# Patient Record
Sex: MALE | Race: WHITE | NOT HISPANIC OR LATINO | Employment: FULL TIME | URBAN - METROPOLITAN AREA
[De-identification: names, ages, dates, MRNs, and addresses within clinical notes are randomized per-mention and may not be internally consistent; named-entity substitution may affect disease eponyms.]

---

## 2018-12-28 ENCOUNTER — OFFICE VISIT (OUTPATIENT)
Dept: FAMILY MEDICINE | Facility: CLINIC | Age: 28
End: 2018-12-28
Payer: COMMERCIAL

## 2018-12-28 VITALS
HEIGHT: 69 IN | WEIGHT: 150 LBS | TEMPERATURE: 98.2 F | RESPIRATION RATE: 14 BRPM | HEART RATE: 96 BPM | OXYGEN SATURATION: 99 % | SYSTOLIC BLOOD PRESSURE: 116 MMHG | DIASTOLIC BLOOD PRESSURE: 60 MMHG | BODY MASS INDEX: 22.22 KG/M2

## 2018-12-28 DIAGNOSIS — Z91.010 PEANUT ALLERGY: ICD-10-CM

## 2018-12-28 DIAGNOSIS — L30.8 OTHER ECZEMA: ICD-10-CM

## 2018-12-28 DIAGNOSIS — L70.0 ACNE VULGARIS: Primary | ICD-10-CM

## 2018-12-28 PROCEDURE — 99203 OFFICE O/P NEW LOW 30 MIN: CPT | Performed by: FAMILY MEDICINE

## 2018-12-28 RX ORDER — EPINEPHRINE 0.3 MG/.3ML
0.3 INJECTION SUBCUTANEOUS
COMMUNITY
Start: 2016-07-25 | End: 2018-12-28

## 2018-12-28 RX ORDER — DIPHENHYDRAMINE HCL 25 MG
25-50 CAPSULE ORAL
COMMUNITY
Start: 2016-08-07

## 2018-12-28 RX ORDER — EPINEPHRINE 0.3 MG/.3ML
0.3 INJECTION SUBCUTANEOUS PRN
Qty: 0.3 ML | Refills: 3 | Status: SHIPPED | OUTPATIENT
Start: 2018-12-28

## 2018-12-28 RX ORDER — TRIAMCINOLONE ACETONIDE 1 MG/G
CREAM TOPICAL
Qty: 30 G | Refills: 11 | Status: SHIPPED | OUTPATIENT
Start: 2018-12-28

## 2018-12-28 RX ORDER — KETOCONAZOLE 20 MG/G
CREAM TOPICAL
COMMUNITY
Start: 2016-07-25

## 2018-12-28 RX ORDER — CLINDAMYCIN PHOSPHATE 11.9 MG/ML
SOLUTION TOPICAL 2 TIMES DAILY
Qty: 60 ML | Refills: 11 | Status: SHIPPED | OUTPATIENT
Start: 2018-12-28 | End: 2019-12-28

## 2018-12-28 RX ORDER — TRIAMCINOLONE ACETONIDE 1 MG/G
OINTMENT TOPICAL
COMMUNITY
Start: 2016-07-25 | End: 2019-01-16

## 2018-12-28 ASSESSMENT — MIFFLIN-ST. JEOR: SCORE: 1644.19

## 2018-12-28 NOTE — PATIENT INSTRUCTIONS
Consider shea moisturizing cream for hands.  Use triamcinolone cream three times daily for up to 7 days, then sparingly - lowest effective dose, least frequent dosing.    OK to use epipen for reaction to peanuts if you come into contact with them.    I recommend use of clotrimazole 1% OTC cream to athlete's foot. If not resolved in 1 week or so then trial of triamcinolone.  If still not resolved within 1 week.    Use acne medicines as prescribed.

## 2018-12-28 NOTE — PROGRESS NOTES
"  SUBJECTIVE:   Nnamdi Reed is a 28 year old male who presents to clinic today for the following health issues:    Eczema and acne      Duration: ongoing issue    Description: Feels acne is getting out of control    Intensity:  moderate    Accompanying signs and symptoms: None    History (similar episodes/previous evaluation): Eczema     Precipitating or alleviating factors:  New exposures:  None  Recent travel: no      Therapies tried and outcome: triamcinolone ointment 1%    Athlete's Foot      Duration: ongoing issue    Description:  Bilateral feet    Intensity:  moderate    Accompanying signs and symptoms: itchiness, burning    History (similar episodes/previous evaluation): YES     Precipitating or alleviating factors:  New exposures:  None  Recent travel: no      Therapies tried and outcome: Ketoconazole ointment 2%    The last time he got to went to the doctor he was given triamcinolone ointment 0.1% to use at nighttime. It helped a little, but didn't want to use during the day because it was so greasy.    He was using ketoconazole for athlete's foot. 2% cream was not helping.    He has a prescription for an Epipen. This is for allergy to peanuts. Last used Epipen around August, 2016.    He was wondering about his forehead breaking out with acne more than usual. He has used tretinoin cream topically. Used benzoyl peroxide - only his face.    Soc Hx  Doesn't smoke  Rare alcohol use    PMHx  Peanut allergy  Asthma as a kid    Surg Hx  Did have wisdom teeth removed    Family Hx  Mom has diabetes  Paternal grandfather has prostate cancer  Brother has depression    ROS  No fever  Getting over a case of stomach flu - some chills yesterday  Some shoulder discomfort    EXAM:  /60   Pulse 96   Temp 98.2  F (36.8  C)   Resp 14   Ht 1.75 m (5' 8.9\")   Wt 68 kg (150 lb)   SpO2 99%   BMI 22.22 kg/m    Constitutional: Healthy, alert, no distress   Cardiovascular: RRR. No murmurs   Respiratory: " Clear to auscultation   Skin: Some scaling of hands and lateral aspect of right foot. Some erythema of forehead with comedones    ASSESSMENT    ICD-10-CM    1. Acne vulgaris L70.0    2. Peanut allergy Z91.010 EPINEPHrine (EPIPEN 2-BRIDGET) 0.3 MG/0.3ML injection 2-pack   3. Other eczema L30.8       Plan:  Patient Instructions   Consider shea moisturizing cream for hands.  Use triamcinolone cream three times daily for up to 7 days, then sparingly - lowest effective dose, least frequent dosing.    OK to use epipen for reaction to peanuts if you come into contact with them.    I recommend use of clotrimazole 1% OTC cream to athlete's foot. If not resolved in 1 week or so then trial of triamcinolone.  If still not resolved within 1 week.    Use acne medicines as prescribed.         Return in about 2 weeks (around 1/11/2019) for Routine Visit.    Jared Kunz MD  Family Medicine Physician

## 2019-01-16 ENCOUNTER — OFFICE VISIT (OUTPATIENT)
Dept: FAMILY MEDICINE | Facility: CLINIC | Age: 29
End: 2019-01-16
Payer: COMMERCIAL

## 2019-01-16 VITALS
BODY MASS INDEX: 22.96 KG/M2 | WEIGHT: 155 LBS | TEMPERATURE: 97.8 F | SYSTOLIC BLOOD PRESSURE: 118 MMHG | HEART RATE: 60 BPM | RESPIRATION RATE: 14 BRPM | DIASTOLIC BLOOD PRESSURE: 76 MMHG | HEIGHT: 69 IN | OXYGEN SATURATION: 98 %

## 2019-01-16 DIAGNOSIS — M67.911 ROTATOR CUFF DYSFUNCTION, RIGHT: Primary | ICD-10-CM

## 2019-01-16 PROCEDURE — 99213 OFFICE O/P EST LOW 20 MIN: CPT | Performed by: FAMILY MEDICINE

## 2019-01-16 ASSESSMENT — MIFFLIN-ST. JEOR: SCORE: 1666.87

## 2019-01-16 NOTE — PATIENT INSTRUCTIONS
Consider applying creams with finger cots or cosmetic pads.  Soak feet 2-3 times a week for 10 minutes or so with salts, bath or foot bath.  Continue antifungal cream (clotrimazole) and apply it to the areas of nail that are affected. I expect improvement over the coming years.    Could use claritin 10mg daily for eye symptoms suggestive of allergy.  Naphazoline is an OTC eye drop that could also be used for these allergy symptoms in the eyes.    Consider probiotics.  Attention to amount of fiber in diet. 20-25 grams per day.    I recommend PT for balancing muscles of shoulder movement. I suspect a degree of pressure under acromial process.

## 2019-01-16 NOTE — PROGRESS NOTES
"  SUBJECTIVE:   Nnamdi Reed is a 28 year old male who presents to clinic today for the following health issues:      Consult- Eyes, digestion and skin issues    The pharmacy didn't have a prescription version of benzoyl peroxide but he is using 2.5%.    He is having a good response, but having difficulty keeping up with recommendations.    Athlete's foot is clearing well with clotrimazole.    He had eye dryness noted ongoing. Has an eye exam next week. His experience is that OTC eye drops for lubrication.    He is exercising more and enjoying doing so.  He is feeling a bit of pain/discomfort in his right shoulder. He noticed this lifting heavy objects overhead.    Since then he has not been experiencing pain or discomfort.    Notes some indigestion symptoms. Has noted some foods are less easily tolerated.  Lactose has helped with milk digestion.  He notes some gassiness, not diarrhea and some loose bowel movements.    EXAM:  /76   Pulse 60   Temp 97.8  F (36.6  C)   Resp 14   Ht 1.75 m (5' 8.9\")   Wt 70.3 kg (155 lb)   SpO2 98%   BMI 22.96 kg/m    Constitutional: Healthy, alert, no distress   EENT: PERRL, TM's clear bilaterally, oropharynx without erythema, no anterior cervical lymphadenopathy   Cardiovascular: RRR. No murmurs   Respiratory: Clear to auscultation   Musculoskeletal: Good shoulder range of motion, slight discomfort inferior to right acromion, no tenderness of biceps tendon.   Skin: some receding of cuticles or 2nd and 3rd fingers on right hand. Slight thickening of right great toenail medial and lateral border and right 5th toenail. Minimal erythema of skin between toes and on hands.    ASSESSMENT    ICD-10-CM    1. Rotator cuff dysfunction, right M67.911 [facility]      Plan:  Patient Instructions   Consider applying creams with finger cots or cosmetic pads.  Soak feet 2-3 times a week for 10 minutes or so with salts, bath or foot " bath.  Continue antifungal cream (clotrimazole) and apply it to the areas of nail that are affected. I expect improvement over the coming years.    Could use claritin 10mg daily for eye symptoms suggestive of allergy.  Naphazoline is an OTC eye drop that could also be used for these allergy symptoms in the eyes.    Consider probiotics.  Attention to amount of fiber in diet. 20-25 grams per day.    I recommend PT for balancing muscles of shoulder movement. I suspect a degree of pressure under acromial process.       Return in about 6 months (around 7/16/2019) for Physical Exam.    Jared Kunz MD  Family Medicine Physician

## 2019-01-29 ENCOUNTER — THERAPY VISIT (OUTPATIENT)
Dept: PHYSICAL THERAPY | Facility: CLINIC | Age: 29
End: 2019-01-29
Payer: COMMERCIAL

## 2019-01-29 DIAGNOSIS — M67.911 ROTATOR CUFF DYSFUNCTION, RIGHT: ICD-10-CM

## 2019-01-29 PROCEDURE — 97530 THERAPEUTIC ACTIVITIES: CPT | Mod: GP | Performed by: PHYSICAL THERAPIST

## 2019-01-29 PROCEDURE — 97110 THERAPEUTIC EXERCISES: CPT | Mod: GP | Performed by: PHYSICAL THERAPIST

## 2019-01-29 PROCEDURE — 97161 PT EVAL LOW COMPLEX 20 MIN: CPT | Mod: GP | Performed by: PHYSICAL THERAPIST

## 2019-01-29 NOTE — PROGRESS NOTES
[facility] Initial Evaluation  Subjective:  [facility]                  Physical Therapy Initial Evaluation  January 29, 2019     Precautions/Restrictions/MD instructions: PT eval and treat.     Subjective:   Date of Onset: September 2018,  MD order on 1/16/19  C/C: right sided lateral shoulder.   Quality of pain is aching and sharp. Pains are described as intermittent in nature. Pain is worse: with use. Pain is rated 3/10.   History of symptoms: Pains began gradually as the result of increasing activity at crossfit. Since onset, symptoms are improving. Previous episodes: none  Worsened by: power cleans(dropping the weight), wall balls, push press  Alleviated by: Rest.    General health as reported by patient: good  Pertinent medical/surgical history: Asthma. He denies night pain, significant current illness or recent hospital admission. He denies any regional implanted devices. He denies history of surgery in the area.  Imaging: none. Current occupational status: teacher. Patient's goals are: decrease pain, improve tolerance to crossfit and overhead lifting. Return to MD:  PRN.     Objective:  SHOULDER:     PROM L PROM R MMT L MMT R   Flex 162 175 5/5 4+/5   Abd 180 180 5/5 5/5   Extension 40 52 5/5 5/5   IR   5/5 5/5   ER 75 62 - improves to 80 following repeated extension 5/5 4/5, improves to 5/5 after repeated shoulder extension   Ext/IR T5 T3 5/5 5/5     Full Chain Flexion: Significant thoracic extension    Palpation: no tenderness to palpation    Assessment/Plan:    The patient is a 28 year old male with chief complaint of right shoulder pain.    The patient has the following significant findings with corresponding treatment plan.  Diagnosis 1:  Right shoulder pain    Pain -  hot/cold therapy, manual therapy, self management, education, directional preference exercise and home program  Decreased ROM/flexibility - manual therapy and therapeutic exercise  Decreased strength - therapeutic exercise  and therapeutic activities  Impaired muscle performance - neuro re-education  Decreased function - therapeutic activities  Impaired posture - neuro re-education    Therapy Evaluation Codes:   1) History comprised of:   Personal factors that impact the plan of care:      None.    Comorbidity factors that impact the plan of care are:      Asthma.     Medications impacting care: None.  2) Examination of Body Systems comprised of:   Body structures and functions that impact the plan of care:      Cervical spine, Shoulder and Thoracic Spine.   Activity limitations that impact the plan of care are:      Lifting and Throwing.   Clinical presentation characteristics are:    Stable/Uncomplicated.  3) Presentation comprised of:   Presentation scored as Low complexity with uncomplicated characteristics..  4) Decision-Making    Low complexity using standardized patient assessment instrument and/or measureable assessment of functional outcome.  Cumulative Therapy Evaluation is: Low complexity.    Previous and current functional limitations:  (See Goal Flow Sheet for this information)    Short term and Long term goals: (See Goal Flow Sheet for this information)     Communication ability:  Patient appears to be able to clearly communicate and understand verbal and written communication and follow directions correctly.  Treatment Explanation - The following has been discussed with the patient: RX ordered/plan of care, anticipated outcomes, and possible risks and side effects.  This patient would benefit from PT intervention to resume normal activities.   Rehab potential is good.    Frequency:  1 X week, once daily  Duration:  for 6 weeks  Discharge Plan: Achieve all LTGs, be independent in home treatment program, and reach maximal therapeutic benefit.    Please refer to the daily flowsheet for treatment today, total treatment time and time spent performing 1:1 timed codes.       Objective:  System    Physical Exam    General      ROS

## 2019-02-07 ENCOUNTER — THERAPY VISIT (OUTPATIENT)
Dept: PHYSICAL THERAPY | Facility: CLINIC | Age: 29
End: 2019-02-07
Payer: COMMERCIAL

## 2019-02-07 DIAGNOSIS — M67.911 ROTATOR CUFF DYSFUNCTION, RIGHT: ICD-10-CM

## 2019-02-07 PROCEDURE — 97112 NEUROMUSCULAR REEDUCATION: CPT | Mod: GP | Performed by: PHYSICAL THERAPIST

## 2019-02-07 PROCEDURE — 97530 THERAPEUTIC ACTIVITIES: CPT | Mod: GP | Performed by: PHYSICAL THERAPIST

## 2019-02-07 PROCEDURE — 97110 THERAPEUTIC EXERCISES: CPT | Mod: GP | Performed by: PHYSICAL THERAPIST

## 2019-06-13 PROBLEM — M67.911 ROTATOR CUFF DYSFUNCTION, RIGHT: Status: RESOLVED | Noted: 2019-01-29 | Resolved: 2019-06-13

## 2019-06-13 NOTE — PROGRESS NOTES
Discharge Note    Progress reporting period is from initial evaluation date (please see noted date below) to Feb 7, 2019.    Nnamdi failed to follow up and current status is unknown.  Please see information below for last relevant information on current status.  Patient seen for 2 visits.    SUBJECTIVE  Subjective changes noted by patient:  Nnamdi reports that he has not had pain during UE lifts and no pain in the past week and a half. Pt reports he feels 70% better overall. He was able to lift 20# more in push press than before without pain.   .  Current pain level is 0/10.     Previous pain level was   .   Changes in function:  Yes (See Goal flowsheet attached for changes in current functional level)  Adverse reaction to treatment or activity: None    OBJECTIVE  Changes noted in objective findings: R shoulder flexion to 165, , ER 70, EXT 50.     ASSESSMENT/PLAN  Diagnosis: right rotator cuff dysfunction   Updated problem list and treatment plan:   Decreased ROM/flexibility - HEP  Decreased strength - HEP  STG/LTGs have been met or progress has been made towards goals:  Yes, please see goal flowsheet for most current information  Assessment of Progress: current status is unknown.    Last current status: Pt is progressing well   Self Management Plans:  HEP  I have re-evaluated this patient and find that the nature, scope, duration and intensity of the therapy is appropriate for the medical condition of the patient.  Nnamdi continues to require the following intervention to meet STG and LTG's:  HEP.    Recommendations:  Discharge with current home program.  Patient to follow up with MD as needed.    Please refer to the daily flowsheet for treatment today, total treatment time and time spent performing 1:1 timed codes.

## 2020-01-10 ENCOUNTER — THERAPY VISIT (OUTPATIENT)
Dept: PHYSICAL THERAPY | Facility: CLINIC | Age: 30
End: 2020-01-10
Payer: COMMERCIAL

## 2020-01-10 DIAGNOSIS — M54.50 ACUTE MIDLINE LOW BACK PAIN WITHOUT SCIATICA: ICD-10-CM

## 2020-01-10 PROCEDURE — 97110 THERAPEUTIC EXERCISES: CPT | Mod: GP | Performed by: PHYSICAL THERAPIST

## 2020-01-10 PROCEDURE — 97112 NEUROMUSCULAR REEDUCATION: CPT | Mod: GP | Performed by: PHYSICAL THERAPIST

## 2020-01-10 PROCEDURE — 97161 PT EVAL LOW COMPLEX 20 MIN: CPT | Mod: GP | Performed by: PHYSICAL THERAPIST

## 2020-01-10 NOTE — PROGRESS NOTES
[facility] [facility] Initial Evaluation  Subjective:  The history is provided by the patient.   Nnamdi Reed being seen for LBP.   Problem began 1/6/2020. Where condition occurred: during recreation / sport.Problem occurred: Deadlift; on the last set after his first workout after the holiday break. Felt a pop in his low back  and reported as 3/10 on pain scale. General health as reported by patient is good. Pertinent medical history includes:  Asthma.         Primary job tasks include:  Prolonged standing and prolonged sitting. Other job/home tasks details: cross fit (3-4 d/wk), swimming 2 d/wk (new activity, November).  Pain is described as aching and is intermittent. Pain is worse in the A.M.. Since onset symptoms are unchanged.      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        Type of problem:  Lumbar   Condition occurred with:  Lifting. This is a new condition    Patient reports pain:  Lower lumbar spine. Radiates to:  No radiation. Associated symptoms:  Loss of motion/stiffness. Symptoms are exacerbated by bending and lifting (driving; dressing (socks and shoes)) and relieved by rest and NSAID's.                      Objective:  System         Lumbar/SI Evaluation  ROM:    AROM Lumbar:   Flexion:          Mod -  Ext:                    Min -   Side Bend:        Left:  Mod -    Right:  Min -  Rotation:           Left:     Right:   Side Glide:        Left:     Right:         Strength: + active SLR; 4+/5 B glute med            Lumbar Palpation:    Tenderness present at Left:    Hip Flexors (dec hip ext)  Tenderness not present at Left:    Erector Spinae or Vertebral    Tenderness not present at Right:  Erector Spinae or Vertebral  Functional Tests:  Core strength and proprioception lumbar: excessive lumbar movement with DL and Squat.        Lumbar Provocation:      Left negative with:  Stork w/ext and PROM hip    Right negative with:   Stork w/ext and PROM hip                                                 General     ROS    Assessment/Plan:    Patient is a 29 year old male with lumbar complaints.    Patient has the following significant findings with corresponding treatment plan.                Diagnosis 1:  Acute LBP  Pain -  manual therapy, self management, education, directional preference exercise and home program  Decreased ROM/flexibility - manual therapy, therapeutic exercise and home program  Decreased joint mobility - manual therapy, therapeutic exercise and home program  Decreased strength - therapeutic exercise, therapeutic activities and home program  Impaired posture - neuro re-education, therapeutic activities and home program    Therapy Evaluation Codes:   Cumulative Therapy Evaluation is: Low complexity.    Previous and current functional limitations:  (See Goal Flow Sheet for this information)    Short term and Long term goals: (See Goal Flow Sheet for this information)     Communication ability:  Patient appears to be able to clearly communicate and understand verbal and written communication and follow directions correctly.  Treatment Explanation - The following has been discussed with the patient:   RX ordered/plan of care  Anticipated outcomes  Possible risks and side effects  This patient would benefit from PT intervention to resume normal activities.   Rehab potential is good.    Frequency:  1 X week, once daily  Duration:  for 3 weeks tapering to 2 X a month over 4 weeks  Discharge Plan:  Achieve all LTG.  Independent in home treatment program.  Return to previous functional level by discharge.  Reach maximal therapeutic benefit.    Please refer to the daily flowsheet for treatment today, total treatment time and time spent performing 1:1 timed codes.

## 2020-01-14 PROBLEM — M54.50 MIDLINE LOW BACK PAIN WITHOUT SCIATICA: Status: ACTIVE | Noted: 2020-01-14

## 2020-03-11 ENCOUNTER — HEALTH MAINTENANCE LETTER (OUTPATIENT)
Age: 30
End: 2020-03-11

## 2020-04-15 PROBLEM — M54.50 MIDLINE LOW BACK PAIN WITHOUT SCIATICA: Status: RESOLVED | Noted: 2020-01-14 | Resolved: 2020-04-15

## 2021-01-03 ENCOUNTER — HEALTH MAINTENANCE LETTER (OUTPATIENT)
Age: 31
End: 2021-01-03

## 2021-04-25 ENCOUNTER — HEALTH MAINTENANCE LETTER (OUTPATIENT)
Age: 31
End: 2021-04-25

## 2021-10-10 ENCOUNTER — HEALTH MAINTENANCE LETTER (OUTPATIENT)
Age: 31
End: 2021-10-10

## 2022-05-21 ENCOUNTER — HEALTH MAINTENANCE LETTER (OUTPATIENT)
Age: 32
End: 2022-05-21

## 2022-09-18 ENCOUNTER — HEALTH MAINTENANCE LETTER (OUTPATIENT)
Age: 32
End: 2022-09-18

## 2023-06-04 ENCOUNTER — HEALTH MAINTENANCE LETTER (OUTPATIENT)
Age: 33
End: 2023-06-04

## 2023-09-08 ENCOUNTER — TRANSFERRED RECORDS (OUTPATIENT)
Dept: HEALTH INFORMATION MANAGEMENT | Facility: CLINIC | Age: 33
End: 2023-09-08

## 2023-10-07 ENCOUNTER — MEDICAL CORRESPONDENCE (OUTPATIENT)
Dept: HEALTH INFORMATION MANAGEMENT | Facility: CLINIC | Age: 33
End: 2023-10-07
Payer: COMMERCIAL

## 2023-10-10 ENCOUNTER — TRANSCRIBE ORDERS (OUTPATIENT)
Dept: OTHER | Age: 33
End: 2023-10-10

## 2023-10-10 DIAGNOSIS — K60.2 ANAL FISSURE: Primary | ICD-10-CM

## 2023-10-19 NOTE — TELEPHONE ENCOUNTER
Diagnosis, Referred by & from: Anal Fissure   Appt date: 1/16/2024   NOTES STATUS DETAILS   OFFICE NOTE from referring provider Received [place]:  9/8/23, 8/9/23 - [facility] OV with Dr. Altamirano (8/9 note within 9/8 note)   OFFICE NOTE from other specialist N/A    DISCHARGE SUMMARY from hospital N/A    DISCHARGE REPORT from the ER N/A    OPERATIVE REPORT N/A    MEDICATION LIST Received    LABS N/A    DIAGNOSTIC PROCEDURES N/A    IMAGING (DISC & REPORT) N/A

## 2023-12-02 ENCOUNTER — OFFICE VISIT (OUTPATIENT)
Dept: URGENT CARE | Facility: URGENT CARE | Age: 33
End: 2023-12-02
Payer: COMMERCIAL

## 2023-12-02 VITALS
DIASTOLIC BLOOD PRESSURE: 86 MMHG | SYSTOLIC BLOOD PRESSURE: 142 MMHG | RESPIRATION RATE: 16 BRPM | TEMPERATURE: 98.3 F | HEART RATE: 62 BPM | OXYGEN SATURATION: 100 %

## 2023-12-02 DIAGNOSIS — H10.33 ACUTE BACTERIAL CONJUNCTIVITIS OF BOTH EYES: Primary | ICD-10-CM

## 2023-12-02 PROCEDURE — 99203 OFFICE O/P NEW LOW 30 MIN: CPT | Performed by: INTERNAL MEDICINE

## 2023-12-02 RX ORDER — POLYMYXIN B SULFATE AND TRIMETHOPRIM 1; 10000 MG/ML; [USP'U]/ML
1-2 SOLUTION OPHTHALMIC EVERY 4 HOURS
Qty: 10 ML | Refills: 0 | Status: SHIPPED | OUTPATIENT
Start: 2023-12-02

## 2023-12-02 NOTE — PROGRESS NOTES
Assessment & Plan     Acute bacterial conjunctivitis of both eyes  - polymixin b-trimethoprim (POLYTRIM) 20198-9.1 UNIT/ML-% ophthalmic solution; Place 1-2 drops into both eyes every 4 hours    Gonzales Harris MD  [facility] [place]    Janeth Coto is a 33 year old, presenting for the following health issues:  Eye Problem (Eye drainage started this morning, dull pain, itchiness) and Medication Update (Taking truvada, nitroglycerin ointment)    HPI   Chief complaint of eye irritation and drainage. Has noted just today the onset of this.  Got COVID vaccine two days ago.  Has been having some cough and raspy voice.  Does not wear contact lenses.      Review of Systems   Constitutional, HEENT, cardiovascular, pulmonary, gi and gu systems are negative, except as otherwise noted.      Objective    BP (!) 142/86   Pulse 62   Temp 98.3  F (36.8  C) (Oral)   Resp 16   SpO2 100%   There is no height or weight on file to calculate BMI.  Physical Exam   GENERAL APPEARANCE: healthy, alert, and no distress  EYES: PERRL, and conjunctiva/corneas- conjunctival injection OU and green colored discharge present bilateral

## 2023-12-14 ENCOUNTER — TELEPHONE (OUTPATIENT)
Dept: SURGERY | Facility: CLINIC | Age: 33
End: 2023-12-14
Payer: COMMERCIAL

## 2023-12-14 NOTE — TELEPHONE ENCOUNTER
LVM, sent mychart to reschedule the following appointment:    Appt on 1/16/24 with Cynthia Dunlap due to the provider not being available at the scheduled time. Reschedule next available appt with the same provider (as of now there are openings later on the same day). Left CC#

## 2024-01-16 ENCOUNTER — PRE VISIT (OUTPATIENT)
Dept: SURGERY | Facility: CLINIC | Age: 34
End: 2024-01-16

## 2024-02-05 ENCOUNTER — OFFICE VISIT (OUTPATIENT)
Dept: SURGERY | Facility: CLINIC | Age: 34
End: 2024-02-05
Payer: COMMERCIAL

## 2024-02-05 VITALS — OXYGEN SATURATION: 99 % | HEART RATE: 79 BPM | DIASTOLIC BLOOD PRESSURE: 82 MMHG | SYSTOLIC BLOOD PRESSURE: 141 MMHG

## 2024-02-05 DIAGNOSIS — K60.2 ANAL FISSURE: ICD-10-CM

## 2024-02-05 PROCEDURE — 99203 OFFICE O/P NEW LOW 30 MIN: CPT | Performed by: NURSE PRACTITIONER

## 2024-02-05 RX ORDER — EMTRICITABINE AND TENOFOVIR DISOPROXIL FUMARATE 200; 300 MG/1; MG/1
TABLET, FILM COATED ORAL
COMMUNITY
Start: 2022-11-01

## 2024-02-05 ASSESSMENT — PAIN SCALES - GENERAL: PAINLEVEL: NO PAIN (0)

## 2024-02-05 NOTE — LETTER
2024       RE: Nnamdi Reed  [address]  [address]  [place] [address]       Dear Colleague,    Thank you for referring your patient, Nnamdi Reed, to the [facility] [place] at [facility]. Please see a copy of my visit note below.    Colon and Rectal Surgery Consult Clinic Note    Date: 2024     Referring provider:  Vicki Altamirano MD  [address] [address]     RE: Nnamdi Reed  : 1990  HAYDEN: 2024    Nnamdi Reed is a very pleasant 33 year old male here for anal fissure.    HPI:  Vincent developed an anal fissure last summer after anal intercourse.  He had never had difficulty with anal intercourse in the past.  He was treated in August with topical nitroglycerin.  He initially felt like he healed but then had anal intercourse again in November and thinks this reopened the anal fissure.  He was then given topical nifedipine and has been using this for 2 months.  He no longer has any pain or bleeding. He tried using anal dilators and started with the smallest one. Had some pain with the second smallest and some bleeding after. Has not tried having anal intercourse since November.    Physical Examination: Exam was chaperoned by Roger Dinero, EMT-P   BP (!) 141/82 (BP Location: Left arm, Patient Position: Sitting, Cuff Size: Adult Regular)   Pulse 79   SpO2 99%   General: alert, oriented, in no acute distress, sitting comfortably  HEENT: mucous membranes moist    Perianal external examination:  Perianal skin: Intact with no excoriation or lichenification.  Lesions: No evidence of an external lesion, nodularity, or induration in the perianal region.  Eversion of buttocks: There was not evidence of an anal fissure. Details: small scar in the posterior midline.    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 No current fissure and he has healed well with topical therapy. No constipation or straining. Discussed that the fissure could be a result of or have caused some pelvic floor dysfunction causing recurrent fissures. I would like him to meet with Rosemary Nevarez PT for evaluation and to see if she can improve this. He is agreeable to this. Will have him follow up as needed if fissure recurs and will need anal Pap at age 46.    Medical history:  No past medical history on file.    Surgical history:  No past surgical history on file.    Problem list:  There are no problems to display for this patient.      Medications:  Current Outpatient Medications   Medication Sig Dispense Refill    emtricitabine-tenofovir (TRUVADA) 200-300 MG per tablet       EPINEPHrine (EPIPEN 2-BRIDGET) 0.3 MG/0.3ML injection 2-pack Inject 0.3 mLs (0.3 mg) into the muscle as needed for anaphylaxis 0.3 mL 3    nifedipine 0.2% in white petrolatum 0.2 % OINT ointment       polymixin b-trimethoprim (POLYTRIM) 19163-9.1 UNIT/ML-% ophthalmic solution Place 1-2 drops into both eyes every 4 hours 10 mL 0    triamcinolone (KENALOG) 0.1 % external cream Use three times a day for up to 7 days then only if needed for exacerbations. 30 g 11    benzoyl peroxide (BREVOXYL) 4 % external gel Use twice daily for acne (Patient not taking: Reported on 12/2/2023) 42.5 g 11    diphenhydrAMINE (BENADRYL) 25 MG capsule Take 25-50 mg by mouth (Patient not taking: Reported on 12/2/2023)      ketoconazole (NIZORAL) 2 % external cream  (Patient not taking: Reported on 12/2/2023)         Allergies:  Allergies   Allergen Reactions    Ipratropium Anaphylaxis    Peanut (Diagnostic) Anaphylaxis, Cramps, Difficulty breathing and Hives    Azithromycin GI Disturbance       Family history:  No family history on file.    Social history:  Social History     Tobacco  Use    Smoking status: Never    Smokeless tobacco: Never   Substance Use Topics    Alcohol use: Not on file    Marital status: single.    Nursing Notes:   Roger Dinero, EMT  2/5/2024 11:43 AM  Signed  Chief Complaint   Patient presents with    Consult       Vitals:    02/05/24 1141   BP: (!) 141/82   BP Location: Left arm   Patient Position: Sitting   Cuff Size: Adult Regular   Pulse: 79   SpO2: 99%       There is no height or weight on file to calculate BMI.    Roger Dinero EMT-P       30 minutes spent on the date of encounter performing chart review, history and exam, documentation and further activities as noted above       This note was created using speech recognition software and may contain unintended word substitutions.      Again, thank you for allowing me to participate in the care of your patient.      Sincerely,    ИВАН Cortez CNP

## 2024-02-05 NOTE — PROGRESS NOTES
Colon and Rectal Surgery Consult Clinic Note    Date: 2024     Referring provider:  Vicki Altamirano MD  [address] [address]     RE: Nnamdi Reed  : 1990  HAYDEN: 2024    Nnamdi Reed is a very pleasant 33 year old male here for anal fissure.    HPI:  Vincent developed an anal fissure last summer after anal intercourse.  He had never had difficulty with anal intercourse in the past.  He was treated in August with topical nitroglycerin.  He initially felt like he healed but then had anal intercourse again in November and thinks this reopened the anal fissure.  He was then given topical nifedipine and has been using this for 2 months.  He no longer has any pain or bleeding. He tried using anal dilators and started with the smallest one. Had some pain with the second smallest and some bleeding after. Has not tried having anal intercourse since November.    Physical Examination: Exam was chaperoned by Roger Dinero, EMT-P   BP (!) 141/82 (BP Location: Left arm, Patient Position: Sitting, Cuff Size: Adult Regular)   Pulse 79   SpO2 99%   General: alert, oriented, in no acute distress, sitting comfortably  HEENT: mucous membranes moist    Perianal external examination:  Perianal skin: Intact with no excoriation or lichenification.  Lesions: No evidence of an external lesion, nodularity, or induration in the perianal region.  Eversion of buttocks: There was not evidence of an anal fissure. Details: small scar in the posterior midline.    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 No current fissure and he has healed well with topical therapy. No constipation or straining. Discussed that the fissure could be a result of or have caused some pelvic floor dysfunction causing recurrent fissures. I would like him to meet with  Rosemary Nevarez PT for evaluation and to see if she can improve this. He is agreeable to this. Will have him follow up as needed if fissure recurs and will need anal Pap at age 46.    Medical history:  No past medical history on file.    Surgical history:  No past surgical history on file.    Problem list:  There are no problems to display for this patient.      Medications:  Current Outpatient Medications   Medication Sig Dispense Refill    emtricitabine-tenofovir (TRUVADA) 200-300 MG per tablet       EPINEPHrine (EPIPEN 2-BRIDGET) 0.3 MG/0.3ML injection 2-pack Inject 0.3 mLs (0.3 mg) into the muscle as needed for anaphylaxis 0.3 mL 3    nifedipine 0.2% in white petrolatum 0.2 % OINT ointment       polymixin b-trimethoprim (POLYTRIM) 50680-8.1 UNIT/ML-% ophthalmic solution Place 1-2 drops into both eyes every 4 hours 10 mL 0    triamcinolone (KENALOG) 0.1 % external cream Use three times a day for up to 7 days then only if needed for exacerbations. 30 g 11    benzoyl peroxide (BREVOXYL) 4 % external gel Use twice daily for acne (Patient not taking: Reported on 12/2/2023) 42.5 g 11    diphenhydrAMINE (BENADRYL) 25 MG capsule Take 25-50 mg by mouth (Patient not taking: Reported on 12/2/2023)      ketoconazole (NIZORAL) 2 % external cream  (Patient not taking: Reported on 12/2/2023)         Allergies:  Allergies   Allergen Reactions    Ipratropium Anaphylaxis    Peanut (Diagnostic) Anaphylaxis, Cramps, Difficulty breathing and Hives    Azithromycin GI Disturbance       Family history:  No family history on file.    Social history:  Social History     Tobacco Use    Smoking status: Never    Smokeless tobacco: Never   Substance Use Topics    Alcohol use: Not on file    Marital status: single.    Nursing Notes:   Roger Dinero, EMT  2/5/2024 11:43 AM  Signed  Chief Complaint   Patient presents with    Consult       Vitals:    02/05/24 1141   BP: (!) 141/82   BP Location: Left arm   Patient Position: Sitting   Cuff Size:  Adult Regular   Pulse: 79   SpO2: 99%       There is no height or weight on file to calculate BMI.    Roger Dinero EMT-P       30 minutes spent on the date of encounter performing chart review, history and exam, documentation and further activities as noted above     ИВАН Barron, NP-C  Colon and Rectal Surgery   [facility]    This note was created using speech recognition software and may contain unintended word substitutions.

## 2024-02-05 NOTE — NURSING NOTE
Chief Complaint   Patient presents with    Consult       Vitals:    02/05/24 1141   BP: (!) 141/82   BP Location: Left arm   Patient Position: Sitting   Cuff Size: Adult Regular   Pulse: 79   SpO2: 99%       There is no height or weight on file to calculate BMI.    Roger Dinero EMT-P

## 2024-02-26 ENCOUNTER — THERAPY VISIT (OUTPATIENT)
Dept: PHYSICAL THERAPY | Facility: CLINIC | Age: 34
End: 2024-02-26
Payer: COMMERCIAL

## 2024-02-26 DIAGNOSIS — M62.89 PELVIC FLOOR DYSFUNCTION: ICD-10-CM

## 2024-02-26 DIAGNOSIS — K60.2 ANAL FISSURE: Primary | ICD-10-CM

## 2024-02-26 PROCEDURE — 97161 PT EVAL LOW COMPLEX 20 MIN: CPT | Mod: GP | Performed by: PHYSICAL THERAPIST

## 2024-02-26 PROCEDURE — 97110 THERAPEUTIC EXERCISES: CPT | Mod: GP | Performed by: PHYSICAL THERAPIST

## 2024-02-26 PROCEDURE — 97140 MANUAL THERAPY 1/> REGIONS: CPT | Mod: GP | Performed by: PHYSICAL THERAPIST

## 2024-02-26 NOTE — PROGRESS NOTES
PHYSICAL THERAPY EVALUATION  Type of Visit: Evaluation    See electronic medical record for Abuse and Falls Screening details.    Subjective       Presenting condition or subjective complaint: Pt noted onset of fissure in July 2023 following anal intercourse. The fissure healed in Nov and then tried having anal sex in Nov and was painful and uncomfortable again and started bleeding. Perscribed meds which did help. The fissure is healed again and at this point, he's somewhat fearful of having intercourse due to pain. did purchase a set of dilators which he tried to use but wasn't successful in that it was painful for him. Doesn't have a history of constipation at all that has contributed to this. Prior to this fissure, was able to engage in receptive anal intercourse without pain or fissures. No change in partner size, toy size, etc.  Date of onset: 07/01/23    Relevant medical history:   Asthma  Dates & types of surgery:  [place] teeth    Prior diagnostic imaging/testing results:       Prior therapy history for the same diagnosis, illness or injury:        Prior Level of Function  Transfers:   Ambulation:   ADL:   IADL:     Living Environment  Social support:   with significant other  Type of home:   apartment/condo  Stairs to enter the home:       8  Ramp:   No  Stairs inside the home:      Yes, 20   Help at home:  NA  Equipment owned:       Employment:    law student  Hobbies/Interests:  exercise (cross fit, biking, running, yoga)    Patient goals for therapy: Have receptive anal intercourse.    Pain assessment:      Objective      PELVIC EVALUATION  ADDITIONAL HISTORY:  Sex assigned at birth:    Gender identity:      Pronouns:        Bladder History:  Feels bladder filling:    Triggers for feeling of inability to wait to go to the bathroom:      How long can you wait to urinate:    Gets up at night to urinate:      Can stop the flow of urine when urinating:    Volume of urine usually released:     Other issues:     Number of bladder infections in last 12 months:    Fluid intake per day:        Medications taken for bladder:       Activities causing urine leak:      Amount of urine typically leaked:    Pads used to help with leaking:          Bowel History:  Frequency of bowel movement:    Consistency of stool:      Ignores the urge to defecate:    Other bowel issues:    Length of time spent trying to have a bowel movement:      Sexual Function History:  Sexual orientation:      Sexually active:    Lubrication used:      Pelvic pain:      Pain or difficulty with orgasms/erection/ejaculation:      State of menopause:    Hormone medications:             Discussed reason for referral regarding pelvic health needs and external/internal pelvic floor muscle examination with patient/guardian.  Opportunity provided to ask questions and verbal consent for assessment and intervention was given.    PAIN:   POSTURE:   LUMBAR SCREEN:   HIP SCREEN:  Strength:    Functional Strength Testing:     PELVIC/SI SCREEN:     PAIN PROVOCATION TEST:   PELVIS/SI SPECIAL TESTS:   BREATHING SYMMETRY:     PELVIC EXAM  External Visual Inspection:  At rest: Elevated perineal body  With voluntary pelvic floor contraction: Perineal elevation  Relaxation of PFM: Partial/delayed relaxation  With intra-abdominal pressure: Bearing down as defecation: Perineal descent, but appeared to not be complete.    Integumentary:   Anal: unremarkable    External Digital Palpation per Perineum:   Transverse perineal: Tightness  Levator ani: Tightness  Perineal body: Tightness  Coccyx: Tightness    Scar:   Location/Type:   Mobility:     Internal Digital Palpation:  Per Vagina:      Per Rectum:  Myofascial Resistance to Palpation: Firm  Digital Muscle Performance: P (Power): 2/5 with delayed relaxation and poor ROM  Compensations: none  Relaxation Post-Contraction: Partial/delayed relaxation      Pelvic Organ Prolapse:       ABDOMINAL ASSESSMENT  Diastasis Rectus Abdominis  (COOPER):      Abdominal Activation/Strength:     Scar:   Location/Type:   Mobility:     Fascial Tension/Restriction:     BIOFEEDBACK:  Position:   Surface Electrodes:     Abdominals:     Perianals:       DERMATOMES:   DTR S:     Assessment & Plan   CLINICAL IMPRESSIONS  Medical Diagnosis: Anal fissure    Treatment Diagnosis: Pelvic floor dysfunction   Impression/Assessment: Patient is a 33 year old male with anal pain complaints.  The following significant findings have been identified: Pain, Decreased ROM/flexibility, Decreased strength, Impaired muscle performance, and Decreased activity tolerance. These impairments interfere with their ability to perform self care tasks and recreational activities as compared to previous level of function.     Clinical Decision Making (Complexity):  Clinical Presentation: Stable/Uncomplicated  Clinical Presentation Rationale: based on medical and personal factors listed in PT evaluation  Clinical Decision Making (Complexity): Low complexity    PLAN OF CARE  Treatment Interventions:  Modalities: Biofeedback, Dry Needling  Interventions: Manual Therapy, Neuromuscular Re-education, Therapeutic Activity, Therapeutic Exercise, Self-Care/Home Management    Long Term Goals     PT Goal 1  Goal Identifier: Anal penetration  Goal Description: Pt to be able to tolerate anal penetration for bowel movements and sexual activity without pain  Rationale: to maximize safety and independence with self cares;to maximize safety and independence with performance of ADLs and functional tasks  Target Date: 05/20/24      Frequency of Treatment: 1x/wk  Duration of Treatment: 8-12 weeks    Recommended Referrals to Other Professionals:   Education Assessment:   Learner/Method: No Barriers to Learning    Risks and benefits of evaluation/treatment have been explained.   Patient/Family/caregiver agrees with Plan of Care.     Evaluation Time:     PT Eval, Low Complexity Minutes (17456): 15       Signing  Clinician: Marie Nevarez, PT

## 2024-03-25 ENCOUNTER — THERAPY VISIT (OUTPATIENT)
Dept: PHYSICAL THERAPY | Facility: CLINIC | Age: 34
End: 2024-03-25
Payer: COMMERCIAL

## 2024-03-25 DIAGNOSIS — M62.89 PELVIC FLOOR DYSFUNCTION: ICD-10-CM

## 2024-03-25 DIAGNOSIS — K60.2 ANAL FISSURE: Primary | ICD-10-CM

## 2024-03-25 DIAGNOSIS — K60.2 ANAL FISSURE: ICD-10-CM

## 2024-03-25 DIAGNOSIS — M62.89 PELVIC FLOOR DYSFUNCTION: Primary | ICD-10-CM

## 2024-03-25 PROCEDURE — 97140 MANUAL THERAPY 1/> REGIONS: CPT | Mod: GP | Performed by: PHYSICAL THERAPIST

## 2024-03-25 PROCEDURE — 97530 THERAPEUTIC ACTIVITIES: CPT | Mod: GP | Performed by: PHYSICAL THERAPIST

## 2024-04-02 ENCOUNTER — THERAPY VISIT (OUTPATIENT)
Dept: PHYSICAL THERAPY | Facility: CLINIC | Age: 34
End: 2024-04-02
Attending: NURSE PRACTITIONER
Payer: COMMERCIAL

## 2024-04-02 DIAGNOSIS — K60.2 ANAL FISSURE: ICD-10-CM

## 2024-04-02 DIAGNOSIS — M62.89 PELVIC FLOOR DYSFUNCTION: Primary | ICD-10-CM

## 2024-04-02 PROCEDURE — 97140 MANUAL THERAPY 1/> REGIONS: CPT | Mod: GP | Performed by: PHYSICAL THERAPIST

## 2024-04-02 PROCEDURE — 97110 THERAPEUTIC EXERCISES: CPT | Mod: GP | Performed by: PHYSICAL THERAPIST

## 2024-04-02 PROCEDURE — 97530 THERAPEUTIC ACTIVITIES: CPT | Mod: GP | Performed by: PHYSICAL THERAPIST

## 2024-04-09 ENCOUNTER — THERAPY VISIT (OUTPATIENT)
Dept: PHYSICAL THERAPY | Facility: CLINIC | Age: 34
End: 2024-04-09
Attending: NURSE PRACTITIONER
Payer: COMMERCIAL

## 2024-04-09 DIAGNOSIS — K60.2 ANAL FISSURE: Primary | ICD-10-CM

## 2024-04-09 DIAGNOSIS — M62.89 PELVIC FLOOR DYSFUNCTION: ICD-10-CM

## 2024-04-09 PROCEDURE — 97530 THERAPEUTIC ACTIVITIES: CPT | Mod: GP | Performed by: PHYSICAL THERAPIST

## 2024-04-16 ENCOUNTER — THERAPY VISIT (OUTPATIENT)
Dept: PHYSICAL THERAPY | Facility: CLINIC | Age: 34
End: 2024-04-16
Payer: COMMERCIAL

## 2024-04-16 DIAGNOSIS — M62.89 PELVIC FLOOR DYSFUNCTION: ICD-10-CM

## 2024-04-16 DIAGNOSIS — K60.2 ANAL FISSURE: Primary | ICD-10-CM

## 2024-04-16 PROCEDURE — 97530 THERAPEUTIC ACTIVITIES: CPT | Mod: GP | Performed by: PHYSICAL THERAPIST

## 2024-04-16 PROCEDURE — 97140 MANUAL THERAPY 1/> REGIONS: CPT | Mod: GP | Performed by: PHYSICAL THERAPIST

## 2024-04-23 ENCOUNTER — THERAPY VISIT (OUTPATIENT)
Dept: PHYSICAL THERAPY | Facility: CLINIC | Age: 34
End: 2024-04-23
Payer: COMMERCIAL

## 2024-04-23 DIAGNOSIS — M62.89 PELVIC FLOOR DYSFUNCTION: ICD-10-CM

## 2024-04-23 DIAGNOSIS — K60.2 ANAL FISSURE: Primary | ICD-10-CM

## 2024-04-23 PROCEDURE — 97530 THERAPEUTIC ACTIVITIES: CPT | Mod: GP | Performed by: PHYSICAL THERAPIST

## 2024-04-23 PROCEDURE — 20561 NDL INSJ W/O NJX 3+ MUSC: CPT | Mod: GA | Performed by: PHYSICAL THERAPIST

## 2024-05-07 ENCOUNTER — THERAPY VISIT (OUTPATIENT)
Dept: PHYSICAL THERAPY | Facility: CLINIC | Age: 34
End: 2024-05-07
Payer: COMMERCIAL

## 2024-05-07 DIAGNOSIS — K60.2 ANAL FISSURE: Primary | ICD-10-CM

## 2024-05-07 DIAGNOSIS — M62.89 PELVIC FLOOR DYSFUNCTION: ICD-10-CM

## 2024-05-07 PROCEDURE — 20561 NDL INSJ W/O NJX 3+ MUSC: CPT | Performed by: PHYSICAL THERAPIST

## 2024-05-14 ENCOUNTER — THERAPY VISIT (OUTPATIENT)
Dept: PHYSICAL THERAPY | Facility: CLINIC | Age: 34
End: 2024-05-14
Payer: COMMERCIAL

## 2024-05-14 DIAGNOSIS — K60.2 ANAL FISSURE: Primary | ICD-10-CM

## 2024-05-14 DIAGNOSIS — M62.89 PELVIC FLOOR DYSFUNCTION: ICD-10-CM

## 2024-05-14 PROCEDURE — 97530 THERAPEUTIC ACTIVITIES: CPT | Mod: GP | Performed by: PHYSICAL THERAPIST

## 2024-05-14 PROCEDURE — 20560 NDL INSJ W/O NJX 1 OR 2 MUSC: CPT | Performed by: PHYSICAL THERAPIST

## 2024-05-21 ENCOUNTER — THERAPY VISIT (OUTPATIENT)
Dept: PHYSICAL THERAPY | Facility: CLINIC | Age: 34
End: 2024-05-21
Payer: COMMERCIAL

## 2024-05-21 DIAGNOSIS — K60.2 ANAL FISSURE: Primary | ICD-10-CM

## 2024-05-21 DIAGNOSIS — M62.89 PELVIC FLOOR DYSFUNCTION: ICD-10-CM

## 2024-05-21 PROCEDURE — 97530 THERAPEUTIC ACTIVITIES: CPT | Mod: GP | Performed by: PHYSICAL THERAPIST

## 2024-05-21 PROCEDURE — 20560 NDL INSJ W/O NJX 1 OR 2 MUSC: CPT | Performed by: PHYSICAL THERAPIST

## 2024-06-10 ENCOUNTER — THERAPY VISIT (OUTPATIENT)
Dept: PHYSICAL THERAPY | Facility: CLINIC | Age: 34
End: 2024-06-10
Payer: COMMERCIAL

## 2024-06-10 DIAGNOSIS — K60.2 ANAL FISSURE: Primary | ICD-10-CM

## 2024-06-10 DIAGNOSIS — M62.89 PELVIC FLOOR DYSFUNCTION: ICD-10-CM

## 2024-06-10 PROCEDURE — 97110 THERAPEUTIC EXERCISES: CPT | Mod: GP | Performed by: PHYSICAL THERAPIST

## 2024-06-10 PROCEDURE — 97530 THERAPEUTIC ACTIVITIES: CPT | Mod: GP | Performed by: PHYSICAL THERAPIST

## 2024-06-10 PROCEDURE — 20560 NDL INSJ W/O NJX 1 OR 2 MUSC: CPT | Mod: GA | Performed by: PHYSICAL THERAPIST

## 2024-06-20 ENCOUNTER — THERAPY VISIT (OUTPATIENT)
Dept: PHYSICAL THERAPY | Facility: CLINIC | Age: 34
End: 2024-06-20
Payer: COMMERCIAL

## 2024-06-20 DIAGNOSIS — M62.89 PELVIC FLOOR DYSFUNCTION: ICD-10-CM

## 2024-06-20 DIAGNOSIS — K60.2 ANAL FISSURE: Primary | ICD-10-CM

## 2024-06-20 PROCEDURE — 97530 THERAPEUTIC ACTIVITIES: CPT | Mod: GP | Performed by: PHYSICAL THERAPIST

## 2024-07-14 ENCOUNTER — HEALTH MAINTENANCE LETTER (OUTPATIENT)
Age: 34
End: 2024-07-14

## 2024-08-10 ENCOUNTER — OFFICE VISIT (OUTPATIENT)
Dept: URGENT CARE | Facility: URGENT CARE | Age: 34
End: 2024-08-10
Payer: COMMERCIAL

## 2024-08-10 VITALS
DIASTOLIC BLOOD PRESSURE: 84 MMHG | OXYGEN SATURATION: 96 % | HEART RATE: 70 BPM | TEMPERATURE: 97.3 F | RESPIRATION RATE: 18 BRPM | SYSTOLIC BLOOD PRESSURE: 125 MMHG

## 2024-08-10 DIAGNOSIS — L30.1 DYSHIDROTIC ECZEMA: ICD-10-CM

## 2024-08-10 DIAGNOSIS — J02.9 SORE THROAT: ICD-10-CM

## 2024-08-10 DIAGNOSIS — L50.9 URTICARIA: Primary | ICD-10-CM

## 2024-08-10 LAB — DEPRECATED S PYO AG THROAT QL EIA: NEGATIVE

## 2024-08-10 PROCEDURE — 99213 OFFICE O/P EST LOW 20 MIN: CPT | Performed by: PHYSICIAN ASSISTANT

## 2024-08-10 PROCEDURE — 87651 STREP A DNA AMP PROBE: CPT | Performed by: PHYSICIAN ASSISTANT

## 2024-08-10 RX ORDER — DOXYCYCLINE HYCLATE 75 MG/1
75 TABLET, DELAYED RELEASE ORAL 2 TIMES DAILY
COMMUNITY

## 2024-08-10 RX ORDER — AMOXICILLIN 875 MG
TABLET ORAL 2 TIMES DAILY
COMMUNITY

## 2024-08-10 RX ORDER — TRIAMCINOLONE ACETONIDE 1 MG/G
CREAM TOPICAL 2 TIMES DAILY
Qty: 45 G | Refills: 1 | Status: SHIPPED | OUTPATIENT
Start: 2024-08-10

## 2024-08-10 RX ORDER — PREDNISONE 20 MG/1
TABLET ORAL
Qty: 20 TABLET | Refills: 0 | Status: SHIPPED | OUTPATIENT
Start: 2024-08-10

## 2024-08-10 NOTE — PROGRESS NOTES
Amox day 10, doxy, strep, pred    SUBJECTIVE:  Nnamdi Sneed is a 34 year old male who presents to the clinic today for a rash.  Onset of rash was 1 week(s) ago.   Rash is gradual onset.  Location of the rash: hand.  Quality/symptoms of rash: itching and blistering   Symptoms are mild and rash seems to be worsening. Some sore throat.      No past medical history on file.  Current Outpatient Medications   Medication Sig Dispense Refill    amoxicillin (AMOXIL) 875 MG tablet Take by mouth 2 times daily      doxycycline hyclate (DORYX) 75 MG EC tablet Take 75 mg by mouth 2 times daily      emtricitabine-tenofovir (TRUVADA) 200-300 MG per tablet       EPINEPHrine (EPIPEN 2-BRIDGET) 0.3 MG/0.3ML injection 2-pack Inject 0.3 mLs (0.3 mg) into the muscle as needed for anaphylaxis 0.3 mL 3    nifedipine 0.2% in white petrolatum 0.2 % OINT ointment       predniSONE (DELTASONE) 20 MG tablet Take 3 tabs by mouth daily x 3 days, then 2 tabs daily x 3 days, then 1 tab daily x 3 days, then 1/2 tab daily x 3 days. 20 tablet 0    triamcinolone (KENALOG) 0.1 % external cream Apply topically 2 times daily 45 g 1    triamcinolone (KENALOG) 0.1 % external cream Use three times a day for up to 7 days then only if needed for exacerbations. 30 g 11    benzoyl peroxide (BREVOXYL) 4 % external gel Use twice daily for acne (Patient not taking: Reported on 12/2/2023) 42.5 g 11    diphenhydrAMINE (BENADRYL) 25 MG capsule Take 25-50 mg by mouth (Patient not taking: Reported on 12/2/2023)      ketoconazole (NIZORAL) 2 % external cream  (Patient not taking: Reported on 12/2/2023)      polymixin b-trimethoprim (POLYTRIM) 65224-7.1 UNIT/ML-% ophthalmic solution Place 1-2 drops into both eyes every 4 hours 10 mL 0     Social History     Tobacco Use    Smoking status: Never    Smokeless tobacco: Never   Substance Use Topics    Alcohol use: Not on file       ROS:  Review of systems negative except as stated above.    OBJECTIVE:   /84 (BP  Location: Right arm)   Pulse 70   Temp 97.3  F (36.3  C) (Temporal)   Resp 18   SpO2 96%   GENERAL APPEARANCE: healthy, alert and no distress  HENT: ear canals and TM's normal.  Nose normal.  Pharynx erythematous with some exudate noted.  NECK: supple, non-tender to palpation, no adenopathy noted  RESP: lungs clear to auscultation - no rales, rhonchi or wheezes  CV: regular rates and rhythm, normal S1 S2, no murmur noted  SKIN: no suspicious lesions or rashes    Results for orders placed or performed in visit on 08/10/24   Streptococcus A Rapid Screen w/Reflex to PCR - Clinic Collect     Status: Normal    Specimen: Throat; Swab   Result Value Ref Range    Group A Strep antigen Negative Negative   Group A Streptococcus PCR Throat Swab     Status: Normal    Specimen: Throat; Swab   Result Value Ref Range    Group A strep by PCR Not Detected Not Detected    Narrative    The Xpert Xpress Strep A test, performed on the [employer], is a rapid, qualitative in vitro diagnostic test for the detection of Streptococcus pyogenes (Group A ß-hemolytic Streptococcus, Strep A) in throat swab specimens from patients with signs and symptoms of pharyngitis. The Xpert Xpress Strep A test can be used as an aid in the diagnosis of Group A Streptococcal pharyngitis. The assay is not intended to monitor treatment for Group A Streptococcus infections. The Xpert Xpress Strep A test utilizes an automated real-time polymerase chain reaction (PCR) to detect Streptococcus pyogenes DNA.         ASSESSMENT:  (L50.9) Urticaria  (primary encounter diagnosis)  Plan: predniSONE (DELTASONE) 20 MG tablet      (J02.9) Sore throat  Plan: Streptococcus A Rapid Screen w/Reflex to PCR -         Clinic Collect, Group A Streptococcus PCR         Throat Swab         (L30.1) Dyshidrotic eczema  Plan: predniSONE (DELTASONE) 20 MG tablet,         triamcinolone (KENALOG) 0.1 % external cream

## 2024-08-11 LAB — GROUP A STREP BY PCR: NOT DETECTED

## 2024-11-07 PROBLEM — M62.89 PELVIC FLOOR DYSFUNCTION: Status: RESOLVED | Noted: 2024-02-26 | Resolved: 2024-11-07

## 2024-11-07 PROBLEM — K60.2 ANAL FISSURE: Status: RESOLVED | Noted: 2024-02-26 | Resolved: 2024-11-07

## 2025-07-19 ENCOUNTER — HEALTH MAINTENANCE LETTER (OUTPATIENT)
Age: 35
End: 2025-07-19